# Patient Record
Sex: MALE | Race: WHITE | NOT HISPANIC OR LATINO | URBAN - METROPOLITAN AREA
[De-identification: names, ages, dates, MRNs, and addresses within clinical notes are randomized per-mention and may not be internally consistent; named-entity substitution may affect disease eponyms.]

---

## 2024-04-23 ENCOUNTER — OFFICE VISIT (OUTPATIENT)
Dept: UROLOGY | Facility: CLINIC | Age: 44
End: 2024-04-23
Payer: COMMERCIAL

## 2024-04-23 VITALS
WEIGHT: 286.38 LBS | HEIGHT: 73 IN | BODY MASS INDEX: 37.95 KG/M2 | HEART RATE: 83 BPM | SYSTOLIC BLOOD PRESSURE: 122 MMHG | DIASTOLIC BLOOD PRESSURE: 83 MMHG

## 2024-04-23 DIAGNOSIS — Z30.09 SCREENING AND EVALUATION FOR VASECTOMY: Primary | ICD-10-CM

## 2024-04-23 DIAGNOSIS — Z79.2 PROPHYLACTIC ANTIBIOTIC: ICD-10-CM

## 2024-04-23 PROCEDURE — 99999 PR PBB SHADOW E&M-NEW PATIENT-LVL III: CPT | Mod: PBBFAC,,, | Performed by: NURSE PRACTITIONER

## 2024-04-23 PROCEDURE — 1159F MED LIST DOCD IN RCRD: CPT | Mod: CPTII,S$GLB,, | Performed by: NURSE PRACTITIONER

## 2024-04-23 PROCEDURE — 3008F BODY MASS INDEX DOCD: CPT | Mod: CPTII,S$GLB,, | Performed by: NURSE PRACTITIONER

## 2024-04-23 PROCEDURE — 3079F DIAST BP 80-89 MM HG: CPT | Mod: CPTII,S$GLB,, | Performed by: NURSE PRACTITIONER

## 2024-04-23 PROCEDURE — 1160F RVW MEDS BY RX/DR IN RCRD: CPT | Mod: CPTII,S$GLB,, | Performed by: NURSE PRACTITIONER

## 2024-04-23 PROCEDURE — 99204 OFFICE O/P NEW MOD 45 MIN: CPT | Mod: S$GLB,,, | Performed by: NURSE PRACTITIONER

## 2024-04-23 PROCEDURE — 3074F SYST BP LT 130 MM HG: CPT | Mod: CPTII,S$GLB,, | Performed by: NURSE PRACTITIONER

## 2024-04-23 RX ORDER — CIPROFLOXACIN 500 MG/1
500 TABLET ORAL EVERY 12 HOURS
Qty: 10 TABLET | Refills: 0 | Status: SHIPPED | OUTPATIENT
Start: 2024-04-23 | End: 2024-04-28

## 2024-04-23 NOTE — PATIENT INSTRUCTIONS
Schedule patient for in-clinic vasectomy by Dr. Reddy .  Will notify you when to pick-up prescriptions (norco and valium) from our office prior to procedure.  Bring all scripts (Cipro, Valium, and Norco) with you to vasectomy appt.

## 2024-04-23 NOTE — PROGRESS NOTES
Subjective:       Patient ID: Lauro Jimenes is a 44 y.o. male.    Chief Complaint: Vasectomy Consult    Patient is new to me. He is a 43 yo WM who his here today for a vasectomy consult. Patient has two children (14 and 16 yrs old). His wife will be discontinuing her birth control. Him and his wife does not want anymore children. So he would like to move forward with scheduling a vasectomy at this time. He denies any known injury to testicles and denies any testicular sx. He reports right inguinal surgery over 10 years ago.       Review of Systems   Constitutional:  Negative for chills, fatigue and fever.   Gastrointestinal: Negative.    Genitourinary:  Negative for decreased urine volume, difficulty urinating, discharge, dysuria, flank pain, frequency, hematuria, penile pain, penile swelling, scrotal swelling, testicular pain and urgency.   Musculoskeletal: Negative.    Neurological:  Negative for dizziness, weakness and headaches.   Psychiatric/Behavioral: Negative.           Objective:      Physical Exam  Vitals and nursing note reviewed.   Constitutional:       General: He is not in acute distress.     Appearance: He is well-developed. He is not ill-appearing.   HENT:      Head: Normocephalic and atraumatic.   Eyes:      Pupils: Pupils are equal, round, and reactive to light.   Cardiovascular:      Rate and Rhythm: Normal rate.      Heart sounds: Normal heart sounds.   Pulmonary:      Effort: Pulmonary effort is normal. No respiratory distress.   Abdominal:      Palpations: Abdomen is soft.      Tenderness: There is no abdominal tenderness.   Musculoskeletal:         General: Normal range of motion.      Cervical back: Normal range of motion.   Skin:     General: Skin is warm and dry.   Neurological:      Mental Status: He is alert and oriented to person, place, and time.      Coordination: Coordination normal.   Psychiatric:         Mood and Affect: Mood normal.         Behavior: Behavior normal.          "Thought Content: Thought content normal.         Judgment: Judgment normal.         Assessment:       Problem List Items Addressed This Visit    None  Visit Diagnoses       Screening and evaluation for vasectomy    -  Primary    Relevant Medications    ciprofloxacin HCl (CIPRO) 500 MG tablet    Prophylactic antibiotic        Relevant Medications    ciprofloxacin HCl (CIPRO) 500 MG tablet            Plan:           Lauro Aceves" was seen today for vasectomy consult.    Diagnoses and all orders for this visit:    Screening and evaluation for vasectomy  -     ciprofloxacin HCl (CIPRO) 500 MG tablet; Take 1 tablet (500 mg total) by mouth every 12 (twelve) hours. for 5 days    Prophylactic antibiotic  -     ciprofloxacin HCl (CIPRO) 500 MG tablet; Take 1 tablet (500 mg total) by mouth every 12 (twelve) hours. for 5 days    Other orders  Schedule patient for in-clinic vasectomy by Dr. Reddy .  Will notify you when to pick-up prescriptions (norco and valium) from our office prior to procedure.  Bring all scripts (Cipro, Valium, and Norco) with you to vasectomy appt.     NOTE: Today in-clinic I discussed the minimally invasive "no-scalpel vasectomy" procedure with patient. Patient informed that vasectomy does not work immediately. Him and his spouse should use contraception and he should consider himself fertile until PVSA is negative. He is aware that procedure is non-reversible. Post-op treatment and recommendations (antibiotic therapy and activity level) also discussed with patient. Patient verbalized understanding.    Jude Zambrano DNP        "

## 2024-04-24 ENCOUNTER — TELEPHONE (OUTPATIENT)
Dept: UROLOGY | Facility: CLINIC | Age: 44
End: 2024-04-24
Payer: COMMERCIAL

## 2024-04-24 NOTE — TELEPHONE ENCOUNTER
Message sent to Dr. Reddy today:    Hey,  Patient is scheduled for vasectomy on 6/25 he will need [place] and Valium prescribed.    Please advise.    Thanks

## 2024-06-19 RX ORDER — HYDROCODONE BITARTRATE AND ACETAMINOPHEN 5; 325 MG/1; MG/1
1 TABLET ORAL EVERY 6 HOURS PRN
Qty: 20 TABLET | Refills: 0 | Status: SHIPPED | OUTPATIENT
Start: 2024-06-19 | End: 2024-06-24

## 2024-06-19 RX ORDER — DIAZEPAM 10 MG/1
10 TABLET ORAL
Qty: 1 TABLET | Refills: 0 | Status: SHIPPED | OUTPATIENT
Start: 2024-06-19

## 2024-06-19 RX ORDER — CIPROFLOXACIN 500 MG/1
500 TABLET ORAL 2 TIMES DAILY
Qty: 10 TABLET | Refills: 0 | Status: SHIPPED | OUTPATIENT
Start: 2024-06-19 | End: 2024-06-24